# Patient Record
Sex: MALE | Race: WHITE | Employment: OTHER | URBAN - METROPOLITAN AREA
[De-identification: names, ages, dates, MRNs, and addresses within clinical notes are randomized per-mention and may not be internally consistent; named-entity substitution may affect disease eponyms.]

---

## 2017-01-01 ENCOUNTER — OFFICE VISIT (OUTPATIENT)
Dept: CARDIOLOGY CLINIC | Age: 82
End: 2017-01-01

## 2017-01-01 ENCOUNTER — ANTI-COAG VISIT (OUTPATIENT)
Dept: PHARMACY | Facility: CLINIC | Age: 82
End: 2017-01-01

## 2017-01-01 ENCOUNTER — HOSPITAL ENCOUNTER (OUTPATIENT)
Dept: OTHER | Age: 82
Discharge: HOME | End: 2017-12-31
Attending: INTERNAL MEDICINE | Admitting: INTERNAL MEDICINE

## 2017-01-01 ENCOUNTER — HOSPITAL ENCOUNTER (OUTPATIENT)
Dept: OTHER | Age: 82
Discharge: HOME | End: 2017-11-30
Attending: INTERNAL MEDICINE | Admitting: INTERNAL MEDICINE

## 2017-01-01 ENCOUNTER — HOSPITAL ENCOUNTER (OUTPATIENT)
Dept: OTHER | Age: 82
Discharge: HOME | End: 2017-05-31
Attending: INTERNAL MEDICINE | Admitting: INTERNAL MEDICINE

## 2017-01-01 VITALS
HEART RATE: 69 BPM | HEIGHT: 70 IN | OXYGEN SATURATION: 95 % | SYSTOLIC BLOOD PRESSURE: 144 MMHG | DIASTOLIC BLOOD PRESSURE: 54 MMHG | WEIGHT: 184 LBS | BODY MASS INDEX: 26.34 KG/M2

## 2017-01-01 DIAGNOSIS — I48.91 NEW ONSET A-FIB (HCC): Primary | ICD-10-CM

## 2017-01-01 DIAGNOSIS — I48.20 CHRONIC ATRIAL FIBRILLATION (HCC): ICD-10-CM

## 2017-01-01 DIAGNOSIS — Z79.01 LONG TERM (CURRENT) USE OF ANTICOAGULANTS: ICD-10-CM

## 2017-01-01 DIAGNOSIS — E78.2 MIXED HYPERLIPIDEMIA: ICD-10-CM

## 2017-01-01 DIAGNOSIS — I50.32 CHRONIC DIASTOLIC CONGESTIVE HEART FAILURE (HCC): ICD-10-CM

## 2017-01-01 DIAGNOSIS — R09.89 BILATERAL CAROTID BRUITS: ICD-10-CM

## 2017-01-01 DIAGNOSIS — I10 MALIGNANT HYPERTENSION: ICD-10-CM

## 2017-01-01 DIAGNOSIS — I10 ESSENTIAL HYPERTENSION: ICD-10-CM

## 2017-01-01 DIAGNOSIS — Z79.01 LONG TERM CURRENT USE OF ANTICOAGULANT THERAPY: ICD-10-CM

## 2017-01-01 LAB
INR BLD: 1.7
INR BLD: 2.5
INR BLD: 2.6
INR BLD: 2.7
INR BLD: 2.9
INR BLD: 3.3
INR BLD: 3.5
INR BLD: 3.9
INR BLD: 4

## 2017-01-01 PROCEDURE — 99214 OFFICE O/P EST MOD 30 MIN: CPT | Performed by: INTERNAL MEDICINE

## 2017-01-01 PROCEDURE — 93000 ELECTROCARDIOGRAM COMPLETE: CPT | Performed by: INTERNAL MEDICINE

## 2017-01-01 RX ORDER — ATORVASTATIN CALCIUM 40 MG/1
40 TABLET, FILM COATED ORAL DAILY
COMMUNITY

## 2017-01-17 ENCOUNTER — ANTI-COAG VISIT (OUTPATIENT)
Dept: PHARMACY | Facility: CLINIC | Age: 82
End: 2017-01-17

## 2017-01-17 DIAGNOSIS — Z79.01 LONG TERM (CURRENT) USE OF ANTICOAGULANTS: ICD-10-CM

## 2017-01-17 DIAGNOSIS — I48.20 CHRONIC ATRIAL FIBRILLATION (HCC): ICD-10-CM

## 2017-01-17 LAB — INR BLD: 2.1

## 2017-02-14 ENCOUNTER — ANTI-COAG VISIT (OUTPATIENT)
Dept: PHARMACY | Facility: CLINIC | Age: 82
End: 2017-02-14

## 2017-02-14 DIAGNOSIS — I48.20 CHRONIC ATRIAL FIBRILLATION (HCC): ICD-10-CM

## 2017-02-14 DIAGNOSIS — Z79.01 LONG TERM (CURRENT) USE OF ANTICOAGULANTS: ICD-10-CM

## 2017-02-14 LAB — INR BLD: 2.7

## 2017-02-23 ENCOUNTER — OFFICE VISIT (OUTPATIENT)
Dept: CARDIOLOGY CLINIC | Age: 82
End: 2017-02-23

## 2017-02-23 VITALS
SYSTOLIC BLOOD PRESSURE: 140 MMHG | HEART RATE: 68 BPM | WEIGHT: 185.8 LBS | DIASTOLIC BLOOD PRESSURE: 68 MMHG | BODY MASS INDEX: 26.6 KG/M2 | HEIGHT: 70 IN | OXYGEN SATURATION: 95 %

## 2017-02-23 DIAGNOSIS — I48.20 CHRONIC ATRIAL FIBRILLATION (HCC): Primary | ICD-10-CM

## 2017-02-23 DIAGNOSIS — I10 ESSENTIAL HYPERTENSION: ICD-10-CM

## 2017-02-23 DIAGNOSIS — I50.30 DIASTOLIC CONGESTIVE HEART FAILURE, UNSPECIFIED CONGESTIVE HEART FAILURE CHRONICITY: ICD-10-CM

## 2017-02-23 PROCEDURE — 99214 OFFICE O/P EST MOD 30 MIN: CPT | Performed by: INTERNAL MEDICINE

## 2017-03-14 ENCOUNTER — ANTI-COAG VISIT (OUTPATIENT)
Dept: PHARMACY | Facility: CLINIC | Age: 82
End: 2017-03-14

## 2017-03-14 DIAGNOSIS — I48.20 CHRONIC ATRIAL FIBRILLATION (HCC): ICD-10-CM

## 2017-03-14 DIAGNOSIS — Z79.01 LONG TERM (CURRENT) USE OF ANTICOAGULANTS: ICD-10-CM

## 2017-03-14 LAB — INR BLD: 3.9

## 2017-03-28 ENCOUNTER — ANTI-COAG VISIT (OUTPATIENT)
Dept: PHARMACY | Facility: CLINIC | Age: 82
End: 2017-03-28

## 2017-03-28 DIAGNOSIS — Z79.01 LONG TERM (CURRENT) USE OF ANTICOAGULANTS: ICD-10-CM

## 2017-03-28 DIAGNOSIS — I48.20 CHRONIC ATRIAL FIBRILLATION (HCC): ICD-10-CM

## 2017-03-28 LAB — INR BLD: 2.9

## 2017-04-12 RX ORDER — WARFARIN SODIUM 4 MG/1
4 TABLET ORAL DAILY
Qty: 90 TABLET | Refills: 2 | Status: SHIPPED | OUTPATIENT
Start: 2017-04-12 | End: 2018-01-01 | Stop reason: SDUPTHER

## 2017-11-16 NOTE — PROGRESS NOTES
Mr. Thom Weiss is here for management of anticoagulation for Afib. PMH also significant for HTN, glaucoma, CKD stage 3, and presumed COPD  He presents today w/out complaint. Pt verifies dosing regimen as listed above. Pt denies s/s bleeding/bruising/swelling/SOB. No BRBPR. No melena. No missed doses  No changes in RX/OTCs/Herbal medications. No changes in diet  No EtOH, former smoker. Patient states he has been eating less greens in his diet, encouraged him to increase intake. INR 3.9 is above acceptable therapeutic range of 2-3. Recommend to decrease to 4 mg daily, except 2mg on Mon/Fri. Patient has 4mg tablets. Will continue to monitor and check INR in 2 weeks. Dosing reminder card given with phone number, appointment date and time.    Return to clinic: 11/30 @ 1030 am    Juan Antonio Cordova 2018

## 2017-11-30 NOTE — PROGRESS NOTES
Mr. Marcellus Cheadle is here for management of anticoagulation for Afib. PMH also significant for HTN, glaucoma, CKD stage 3, and presumed COPD  He presents today w/out complaint. Pt verifies dosing regimen as listed above. Pt denies s/s bleeding/bruising/swelling/SOB. No BRBPR. No melena. No missed doses  No changes in RX/OTCs/Herbal medications. No changes in diet  No EtOH, former smoker. INR 2.6 is within acceptable therapeutic range of 2-3. Recommend to continue dose of 4 mg daily, except 2mg on Mon/Fri. Patient has 4mg tablets. Will continue to monitor and check INR in 4 weeks. Dosing reminder card given with phone number, appointment date and time.    Return to clinic: 12/28 @ 1030 am    Nargis Thomas PharmD 10:38 AM 11/30/17

## 2017-12-04 NOTE — PROGRESS NOTES
edema  · Femoral Arteries: 2+ and equal  · Pedal Pulses: 2+ and equal   Abdomen:  · No masses or tenderness  · Liver/Spleen: No Abnormalities Noted  Neurological/Psychiatric:  · Alert and oriented in all spheres  · Moves all extremities well  · Exhibits normal gait balance and coordination  · No abnormalities of mood, affect, memory, mentation, or behavior are noted    ECHO: 11/30/2015  Global ejection fraction is normal and estimated from 55 % to 60 %. No regional wall motion abnormalities are noted. Mild thickening of the mitral valve. Mild mitral and tricuspid regurgitation are present. Systolic pulmonary artery pressure (SPAP) is estimated at 48 mmHg consistent  with mild pulmonary hypertension (RA pressure 3mmHg). Lab Results   Component Value Date     07/12/2016    K 4.5 07/12/2016     07/12/2016    CO2 25 07/12/2016    BUN 40 07/12/2016    CREATININE 1.6 07/12/2016    GLUCOSE 92 07/12/2016    CALCIUM 9.1 07/12/2016        Assessment:     1. Atrial fibrillation, unspecified ([facility] [address]):  Paroxysmal afib. December 2015 EKG showed atrial flutter 66bpm; cannot r/o prior anteroseptal infarct. He was asymptomatic with atrial arrhythmia. CHADS score=5 and he initially had hematuria which is resolved. Now on coumadin and [facility] follows INR. Note EKG today showed sinus bradycardia with prolonged 1st degree AV block; PAC's; PVC  (no significant change from 6/16 study except for ectopy). 2. Chronic diastolic congestive heart failure ([address]):  Resolved. Clinically compensated NYHA Class II and will continue current CHF medical regimen. Note 11/15 admission for acute on chronic diastolic CHF. Will continue diuretic lasix 40mg daily. Goal weight around 185# and he can use extra lasix (80mg) for 2-3 days if weight, LE edema, or SOB increases. 3. Essential hypertension:  Adequately controlled (goal 140/90mmHg or <  and I will continue norvasc 10mg daily.   He has been warned

## 2017-12-04 NOTE — LETTER
[address] [place] [address] Cardiology - [address] [place] [address],[id number]  Phone: [phone]  Fax: [phone]    Sathya Strong MD        December 4, 2017     [facility]  No address on file    Patient: Ryan Berger  MR Number: [id number]  YOB: 1927  Date of Visit: 12/4/2017    Dear Dr. Isamar Nunez:    Thank you for the request for consultation for Becky Cruz to me for the evaluation. Below are the relevant portions of my assessment and plan of care. [facility]   Cardiac Followup    Referring Provider:  Isamar Nunez     Chief Complaint   Patient presents with    6 Month Follow-Up     9 month follow up    Hypertension    Congestive Heart Failure    Atrial Fibrillation    Discuss Labs     renal 7/23/2017, inr 11/30/2017    Fatigue     some more tired than normal. Nothing extreme. Subjective: Mr Nadia Ortega presents for cardiology follow up of HTN, PVD, CHF, A Flutter/Fib; no complaints today     History of Present Illness:    Mr. Nadia Ortega is a 79yo male who presents today for routine cardiac follow up. He has PMH of HTN, CRI, PVD s/p left CEA, and diastolic CHF. He was admitted 11/28/15 with increased SOB/KATE and LE swelling. EKG showed atrial flutter 66bpm; cannot r/o prior anteroseptal infarct. Repeat EKG 12/04/15 showed afib 55 bpm. Note Pro-BNP elevated 8389 and More 0.03, 0.02, 0.04. I personally reviewed most recent ECHO 11/30/15 showing normal EF=55-60% with mild MR/TR and mild pulm HTN 48mmHg (no change from 3/13 study). Diagnosed with acute diastolic CHF and afib/flutter. Note CXR had increased ground glass and interstitial opacities in perihilar and basilar territory. He was diuresed with IV lasix and weight decreased 20# to 185# approximately. Note CRI stable with BUN/Cr=40/1.6 in July 2016. He follows with Dr. Carin Frias for nephrology.   Note EKG on 6/6/16 showed NSR 67bpm; prolonged 1st degree AV block; nonspecific ST changes. His most recent carotid dopplers from 6/2016 <50% bilaterally and was unchanged from 2013. He is taking his coumadin and follows with the [facility]. Today he reports feeling well overall. His blood pressure readings at home are in the 949'S systolic. Denies chest pain, shortness of breath, edema, dizziness, palpitations and syncope. Daughter accompanied him to office today. Past Medical History:   has a past medical history of Aortic aneurysm ([address] ARF (acute renal failure) ([facility] [address] CHF (congestive heart failure) ([facility] [address] CKD (chronic kidney disease); Eye disease; and Hypertension. Surgical History:   has past surgical history that includes eye surgery and Left Carotid endarterectomy. Social History:   reports that he has quit smoking. He has never used smokeless tobacco. He reports that he does not drink alcohol or use drugs. Family History:  family history is not on file. Home Medications:  Prior to Admission medications    Medication Sig Start Date End Date Taking?  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   Yes Historical Provider, MD   Cholecalciferol (VITAMIN D) 1000 UNIT CAPS Take 2,000 Units by mouth    Yes Historical Provider, MD   doxazosin (CARDURA) 1 MG tablet Take 2 mg by mouth 2 times daily    Yes Historical Provider, MD dorzolamide-timolol (COSOPT) 22.3-6.8 MG/ML ophthalmic solution Place 1 drop into both eyes 2 times daily    Yes Historical Provider, MD   warfarin (COUMADIN) 4 MG tablet Take 1 tablet by mouth daily 4/12/17 5/12/17  Shraddha Wood MD   finasteride (PROSCAR) 5 MG tablet Take 1 tablet by mouth daily 12/9/15 2/23/17  Lashawn Ramirez MD   atorvastatin (LIPITOR) 80 MG tablet Take 40 mg by mouth nightly     Historical Provider, MD   moxifloxacin (VIGAMOX) 0.5 % ophthalmic solution Place 1 drop into the right eye every 12 hours 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 Historical Provider, MD        Allergies:  Review of patient's allergies indicates no known allergies. Review of Systems:   · Constitutional: there has been no unanticipated weight loss. There's been no change in energy level, sleep pattern, or activity level. · Eyes: No visual changes or diplopia. No scleral icterus. · ENT: No Headaches, hearing loss or vertigo. No mouth sores or sore throat. · Cardiovascular: Reviewed in HPI  · Respiratory: No cough or wheezing, no sputum production. No hematemesis. · Gastrointestinal: No abdominal pain, appetite loss, blood in stools. No change in bowel or bladder habits. · Genitourinary: No dysuria, trouble voiding, or hematuria. · Musculoskeletal:  No gait disturbance, weakness or joint complaints. · Integumentary: No rash or pruritis. · Neurological: No headache, diplopia, change in muscle strength, numbness or tingling. No change in gait, balance, coordination, mood, affect, memory, mentation, behavior. · Psychiatric: No anxiety, no depression. · Endocrine: No malaise, fatigue or temperature intolerance. No excessive thirst, fluid intake, or urination. No tremor. · Hematologic/Lymphatic: No abnormal bruising or bleeding, blood clots or swollen lymph nodes. · Allergic/Immunologic: No nasal congestion or hives.     Physical Examination:    Vitals:    12/04/17 1017

## 2017-12-28 NOTE — PROGRESS NOTES
MrEugenio Gomes is here for management of anticoagulation for Afib. PMH also significant for HTN, glaucoma, CKD stage 3, and presumed COPD  He presents today w/out complaint. Pt verifies dosing regimen as listed above. Pt denies s/s bleeding/bruising/swelling/SOB. No BRBPR. No melena. No missed doses  No changes in RX/OTCs/Herbal medications. No changes in diet  No EtOH, former smoker. INR 1.7 is below acceptable therapeutic range of 2-3. Recommend to take 4mg x1 (friday), then continue dose of 4 mg daily, except 2mg on Mon/Fri. Patient has 4mg tablets. Will continue to monitor and check INR in 4 weeks. Dosing reminder card given with phone number, appointment date and time. Return to clinic: 1/25 @ 1030 am    Hoa DuttonD. Candidate 2018    I have seen the patient and reviewed the progress note written by the PharmD Candidate. I agree with this assessment and plan.    Yumiko Hensley PharmD 12/28/2017 11:02 AM

## 2018-01-01 ENCOUNTER — HOSPITAL ENCOUNTER (OUTPATIENT)
Dept: OTHER | Age: 83
Discharge: HOME | End: 2018-01-31
Attending: INTERNAL MEDICINE | Admitting: INTERNAL MEDICINE

## 2018-01-01 ENCOUNTER — ANTI-COAG VISIT (OUTPATIENT)
Dept: PHARMACY | Facility: CLINIC | Age: 83
End: 2018-01-01

## 2018-01-01 ENCOUNTER — HOSPITAL ENCOUNTER (OUTPATIENT)
Dept: OTHER | Age: 83
Discharge: HOME | End: 2018-02-28
Attending: INTERNAL MEDICINE | Admitting: INTERNAL MEDICINE

## 2018-01-01 ENCOUNTER — HOSPITAL ENCOUNTER (OUTPATIENT)
Dept: OTHER | Age: 83
Discharge: HOME | End: 2018-04-30
Attending: INTERNAL MEDICINE | Admitting: INTERNAL MEDICINE

## 2018-01-01 ENCOUNTER — OFFICE VISIT (OUTPATIENT)
Dept: ORTHOPEDIC SURGERY | Age: 83
End: 2018-01-01

## 2018-01-01 ENCOUNTER — HOSPITAL ENCOUNTER (OUTPATIENT)
Dept: OTHER | Age: 83
Discharge: HOME | End: 2018-03-31
Attending: INTERNAL MEDICINE | Admitting: INTERNAL MEDICINE

## 2018-01-01 VITALS — BODY MASS INDEX: 26.35 KG/M2 | WEIGHT: 184.08 LBS | HEIGHT: 70 IN

## 2018-01-01 DIAGNOSIS — I48.20 CHRONIC ATRIAL FIBRILLATION (HCC): ICD-10-CM

## 2018-01-01 DIAGNOSIS — Z79.01 LONG TERM CURRENT USE OF ANTICOAGULANT THERAPY: ICD-10-CM

## 2018-01-01 DIAGNOSIS — M54.5 LOW BACK PAIN, UNSPECIFIED BACK PAIN LATERALITY, UNSPECIFIED CHRONICITY, WITH SCIATICA PRESENCE UNSPECIFIED: Primary | ICD-10-CM

## 2018-01-01 DIAGNOSIS — M54.16 LUMBAR RADICULOPATHY: ICD-10-CM

## 2018-01-01 DIAGNOSIS — M51.36 DDD (DEGENERATIVE DISC DISEASE), LUMBAR: ICD-10-CM

## 2018-01-01 LAB
INR BLD: 2.1
INR BLD: 2.2
INR BLD: 2.4
INR BLD: 3.2
INR BLD: 5.3
INR BLD: 6.3

## 2018-01-01 PROCEDURE — 99203 OFFICE O/P NEW LOW 30 MIN: CPT | Performed by: PHYSICAL MEDICINE & REHABILITATION

## 2018-01-01 RX ORDER — WARFARIN SODIUM 4 MG/1
TABLET ORAL
Qty: 90 TABLET | Refills: 1 | Status: SHIPPED | OUTPATIENT
Start: 2018-01-01

## 2018-01-01 RX ORDER — METHYLPREDNISOLONE 4 MG/1
TABLET ORAL
Qty: 1 KIT | Refills: 0 | Status: SHIPPED | OUTPATIENT
Start: 2018-01-01 | End: 2018-01-01 | Stop reason: ALTCHOICE

## 2018-02-22 NOTE — PROGRESS NOTES
Mr. Bob Layne is here for management of anticoagulation for Afib. PMH also significant for HTN, glaucoma, CKD stage 3, and presumed COPD  He presents today w/out complaint. Pt verifies dosing regimen as listed above. Pt denies s/s bleeding/bruising/swelling/SOB. No BRBPR. No melena. No missed doses  No changes in RX/OTCs/Herbal medications. No changes in diet  No EtOH, former smoker. INR 2.4 is within acceptable therapeutic range of 2-3. Recommend to continue dose of 4 mg daily, except 2mg on Mon/Fri. Patient has 4mg tablets. Will continue to monitor and check INR in 4 weeks. Dosing reminder card given with phone number, appointment date and time.    Return to clinic: 3/22 @ 10:45 am    Nato KennedyD 11:05 AM 2/22/18

## 2018-04-17 PROBLEM — N17.9 AKI (ACUTE KIDNEY INJURY) (HCC): Status: ACTIVE | Noted: 2018-01-01

## 2018-04-17 PROBLEM — C79.9 METASTATIC CANCER (HCC): Status: ACTIVE | Noted: 2018-01-01

## 2018-04-18 PROBLEM — N18.9 ACUTE KIDNEY INJURY SUPERIMPOSED ON CKD (HCC): Status: ACTIVE | Noted: 2018-01-01

## 2018-04-18 PROBLEM — J96.02 ACUTE RESPIRATORY FAILURE WITH HYPOXIA AND HYPERCAPNIA (HCC): Status: ACTIVE | Noted: 2018-01-01

## 2018-04-18 PROBLEM — R91.1 LUNG NODULE: Status: ACTIVE | Noted: 2018-01-01

## 2018-04-18 PROBLEM — J90 PLEURAL EFFUSION: Status: ACTIVE | Noted: 2018-01-01

## 2018-04-18 PROBLEM — J96.01 ACUTE RESPIRATORY FAILURE WITH HYPOXIA AND HYPERCAPNIA (HCC): Status: ACTIVE | Noted: 2018-01-01

## 2018-04-18 PROBLEM — D68.9 COAGULOPATHY (HCC): Status: ACTIVE | Noted: 2018-01-01

## 2018-04-18 PROBLEM — J98.11 ATELECTASIS: Status: ACTIVE | Noted: 2018-01-01

## 2018-04-30 ENCOUNTER — ANTI-COAG VISIT (OUTPATIENT)
Dept: PHARMACY | Facility: CLINIC | Age: 83
End: 2018-04-30